# Patient Record
(demographics unavailable — no encounter records)

---

## 2025-02-02 NOTE — HISTORY OF PRESENT ILLNESS
[FreeTextEntry1] : Annual visit/hyperlipidemia [de-identified] : 59-year-old female past medical history of hyperlipidemia 20+ years smoking chronic EtOH use presents for annual visit.  Patient has not had an annual for 2 years.  Patient has had a rough past year she has been through divorce.  Patient currently working and trying to manage her life back together she is hopeful and optimistic, smiling during the interview.  Patient is a longtime smoker and continues to smoke currently about 5 cigarettes/day.  Patient does drink heavy amount of alcohol 3 to 4 glasses of wine every night with intermittent use of high moon.  Patient has no complaints to endorse today All other ROS is negative.

## 2025-02-02 NOTE — HEALTH RISK ASSESSMENT
[Fair] :  ~his/her~ mood as fair [4 or more  times a week (4 pts)] : 4 or more  times a week (4 points) [3 or 4 (1 pt)] : 3 or 4  (1 point) [Never (0 pts)] : Never (0 points) [No] : In the past 12 months have you used drugs other than those required for medical reasons? No [No falls in past year] : Patient reported no falls in the past year [1] : 2) Feeling down, depressed, or hopeless for several days (1) [PHQ-2 Negative - No further assessment needed] : PHQ-2 Negative - No further assessment needed [Current] : Current [20 or more] : 20 or more [HIV test declined] : HIV test declined [Hepatitis C test declined] : Hepatitis C test declined [Alone] : lives alone [Employed] : employed [College] : College [Single] : single [] :  [Sexually Active] : sexually active [Feels Safe at Home] : Feels safe at home [Fully functional (bathing, dressing, toileting, transferring, walking, feeding)] : Fully functional (bathing, dressing, toileting, transferring, walking, feeding) [Fully functional (using the telephone, shopping, preparing meals, housekeeping, doing laundry, using] : Fully functional and needs no help or supervision to perform IADLs (using the telephone, shopping, preparing meals, housekeeping, doing laundry, using transportation, managing medications and managing finances) [Reports normal functional visual acuity (ie: able to read med bottle)] : Reports normal functional visual acuity [Smoke Detector] : smoke detector [Carbon Monoxide Detector] : carbon monoxide detector [Safety elements used in home] : safety elements used in home [Yes] : Yes [de-identified] : none [Audit-CScore] : 5 [de-identified] : weight lifting [[id number]] : 2 [LowDoseCTScan] : never [Change in mental status noted] : No change in mental status noted [Language] : denies difficulty with language [Behavior] : denies difficulty with behavior [Learning/Retaining New Information] : denies difficulty learning/retaining new information [Handling Complex Tasks] : denies difficulty handling complex tasks [Spatial Ability and Orientation] : denies difficulty with spatial ability and orientation [Reasoning] : denies difficulty with reasoning [None] : None [High Risk Behavior] : no high risk behavior [Reports changes in hearing] : Reports no changes in hearing [Reports changes in vision] : Reports no changes in vision [MammogramDate] : 2023 [BoneDensityDate] : 2023 [ColonoscopyComments] :  03/23. Patient reported colonoscopy was normal. With Dr. Lee. [ColonoscopyDate] : 2023 [[facility]2] [facility]

## 2025-02-02 NOTE — PLAN
[FreeTextEntry1] :    59-year-old female past medical history of hyperlipidemia 20+ years smoking chronic EtOH use presents for annual visit.  Patient has not had an annual for 2 years.  Patient has had a rough past year she has been through divorce.  Patient currently working and trying to manage her life back together she is hopeful and optimistic, smiling during the interview.  Patient is a longtime smoker and continues to smoke currently about 5 cigarettes/day.  Patient does drink heavy amount of alcohol 3 to 4 glasses of wine every night with intermittent use of high moon.  Patient has no complaints to endorse today   Annual visit >> Blood work reviewed with patient at bedside >> EKG reviewed >> Mammo needed, DEXA scan, CT lung cancer screening  Hyperlipidemia >> LDL decreased from 2023, currently 128, previously 163 >> Patient advised to continue with diet  Smoking >> Educated on cessation of tobacco use; patient verbalized understanding but feels it has been a very rough time for her recently but she understands that she needs to quit >> Patient will go see cardiologist, referral placed for her cardiac risk assessment >> Patient has multiple risk factors chronic smoker , excessive EtOH use , and family history of coronary artery disease   excessiver ETOH intake >>drinks daily , several drinks >>educated on risks of etoh excessive use >>advised to cut down etoh - patient says she understands she needs to decrease amount, but no clear goal from patient.

## 2025-02-02 NOTE — HISTORY OF PRESENT ILLNESS
[FreeTextEntry1] : Annual visit/hyperlipidemia [de-identified] : 59-year-old female past medical history of hyperlipidemia 20+ years smoking chronic EtOH use presents for annual visit.  Patient has not had an annual for 2 years.  Patient has had a rough past year she has been through divorce.  Patient currently working and trying to manage her life back together she is hopeful and optimistic, smiling during the interview.  Patient is a longtime smoker and continues to smoke currently about 5 cigarettes/day.  Patient does drink heavy amount of alcohol 3 to 4 glasses of wine every night with intermittent use of high moon.  Patient has no complaints to endorse today All other ROS is negative.

## 2025-02-02 NOTE — HEALTH RISK ASSESSMENT
[Fair] :  ~his/her~ mood as fair [4 or more  times a week (4 pts)] : 4 or more  times a week (4 points) [3 or 4 (1 pt)] : 3 or 4  (1 point) [Never (0 pts)] : Never (0 points) [No] : In the past 12 months have you used drugs other than those required for medical reasons? No [No falls in past year] : Patient reported no falls in the past year [PHQ-2 Negative - No further assessment needed] : PHQ-2 Negative - No further assessment needed [1] : 2) Feeling down, depressed, or hopeless for several days (1) [Current] : Current [20 or more] : 20 or more [HIV test declined] : HIV test declined [Hepatitis C test declined] : Hepatitis C test declined [Alone] : lives alone [Employed] : employed [College] : College [Single] : single [] :  [Sexually Active] : sexually active [Feels Safe at Home] : Feels safe at home [Fully functional (bathing, dressing, toileting, transferring, walking, feeding)] : Fully functional (bathing, dressing, toileting, transferring, walking, feeding) [Fully functional (using the telephone, shopping, preparing meals, housekeeping, doing laundry, using] : Fully functional and needs no help or supervision to perform IADLs (using the telephone, shopping, preparing meals, housekeeping, doing laundry, using transportation, managing medications and managing finances) [Reports normal functional visual acuity (ie: able to read med bottle)] : Reports normal functional visual acuity [Smoke Detector] : smoke detector [Carbon Monoxide Detector] : carbon monoxide detector [Safety elements used in home] : safety elements used in home [Yes] : Yes [de-identified] : none [Audit-CScore] : 5 [de-identified] : weight lifting [[id number]] : 2 [LowDoseCTScan] : never [Change in mental status noted] : No change in mental status noted [Language] : denies difficulty with language [Behavior] : denies difficulty with behavior [Learning/Retaining New Information] : denies difficulty learning/retaining new information [Handling Complex Tasks] : denies difficulty handling complex tasks [Reasoning] : denies difficulty with reasoning [Spatial Ability and Orientation] : denies difficulty with spatial ability and orientation [None] : None [High Risk Behavior] : no high risk behavior [Reports changes in hearing] : Reports no changes in hearing [Reports changes in vision] : Reports no changes in vision [MammogramDate] : 2023 [BoneDensityDate] : 2023 [ColonoscopyDate] : 2023 [ColonoscopyComments] :  03/23. Patient reported colonoscopy was normal. With Dr. Lee. [[facility]

## 2025-02-02 NOTE — COUNSELING
[Yes] : Risk of tobacco use and health benefits of smoking cessation discussed: Yes [Cessation strategies including cessation program discussed] : Cessation strategies including cessation program discussed [Use of nicotine replacement therapies and other medications discussed] : Use of nicotine replacement therapies and other medications discussed [No] : Not willing to quit smoking [AUDIT-C Screening administered and reviewed] : AUDIT-C Screening administered and reviewed [Hazards of at-risk alcohol use discussed] : Hazards of at-risk alcohol use discussed [Strategies to reduce or eliminate alcohol use discussed] : Strategies to reduce or eliminate alcohol use discussed [Quit Drinking] : Quit Drinking [ - Annual Lung Cancer Screening/Share Decision Making Discussion] : Annual Lung Cancer Screening/Share Decision Making Discussion. (I have advised this patient to have a Low Dose CT (LDCT) scan of the lungs and have discussed the following with the patient in a shared decision making discussion:   Benefits of Detection and Early Treatment: There is adequate evidence that annual screening for lung cancer with LDCT in a population of high-risk persons can prevent a substantial number of lung cancer-related deaths. The magnitude of benefit depends on the individual patient's risk for lung cancer, as those who are at highest risk are most likely to benefit. Screening cannot prevent most lung cancer-related deaths, and does not replace smoking cessation. Harms of Detection and Early Intervention and Treatment: The harms associated with LDCT screening include false-negative and false-positive results, incidental findings, over diagnosis, and radiation exposure. False-positive LDCT results occur in a substantial proportion of screened persons; 95% of all positive results do not lead to a diagnosis of cancer. In a high-quality screening program, further imaging can resolve most false-positive results; however, some patients may require invasive procedures. Radiation harms, including cancer resulting from cumulative exposure to radiation, vary depending on the age at the start of screening; the number of scans received; and the person's exposure to other sources of radiation, particularly other medical imaging.) [None] : None [Good understanding] : Patient has a good understanding of lifestyle changes and steps needed to achieve self management goal [FreeTextEntry1] : 15

## 2025-02-12 NOTE — HISTORY OF PRESENT ILLNESS
[Current] : Current [TextBox_13] :   Ms. SHERINE RAMÍREZ is a 59 year old woman with a history of nicotine dependence   Over the telephone today we reviewed and confirmed that the patient meets screening eligibility criteria:  -Age: 59 years old  Smoking status:   -Current smoker, 20 pack years    Ms. RAMÍREZ denies any personal history of lung cancer. Denies any s/s of lung cancer. No lung cancer in a 1st degree relative. Denies any history of lung disease. Denies any history of occupational exposures [PacksperYear] : 20

## 2025-02-12 NOTE — REASON FOR VISIT
[Telephone (audio)] : This telephonic visit was provided via audio only technology. [Patient preference] : patient preference. [Verbal consent obtained from patient] : the patient, [unfilled] [Initial Evaluation] : an initial evaluation visit [Review of Eligibility] : review of eligibility [Low-Dose CT Screening Discussion] : low-dose CT lung cancer screening discussion [Virtual Visit] : virtual visit

## 2025-02-18 NOTE — HISTORY OF PRESENT ILLNESS
[TextBox_13] :   Ms. SHERINE RAMÍREZ is a 59 year old woman with a history of nicotine dependence   Over the telephone today we reviewed and confirmed that the patient meets screening eligibility criteria:  -Age: 59 years old  Smoking status:   -Current smoker, 20 pack years    Ms. RAMÍREZ denies any personal history of lung cancer. Denies any s/s of lung cancer. No lung cancer in a 1st degree relative. Denies any history of lung disease. Denies any history of occupational exposures [PacksperYear] : 20

## 2025-04-23 NOTE — HISTORY OF PRESENT ILLNESS
[FreeTextEntry1] : NICANOR P2 24 and 20 yo sons--older in [place] ,younger graduating [employer] Pt works for [employer] in [employer] [Mammogramdate] : emr [BoneDensityDate] : emr [ColonoscopyDate] : emr [Previously active] : previously active [Men] : men

## 2025-04-23 NOTE — PHYSICAL EXAM
[MA] : MA [FreeTextEntry2] : Rebeca Kumar [Appropriately responsive] : appropriately responsive [Alert] : alert [No Acute Distress] : no acute distress [No Lymphadenopathy] : no lymphadenopathy [Soft] : soft [Non-tender] : non-tender [Non-distended] : non-distended [No HSM] : No HSM [No Lesions] : no lesions [No Mass] : no mass [Oriented x3] : oriented x3 [Examination Of The Breasts] : a normal appearance [No Masses] : no breast masses were palpable [Labia Majora] : normal [Labia Minora] : normal [Normal] : normal [Uterine Adnexae] : normal

## 2025-04-23 NOTE — COUNSELING
[Nutrition/ Exercise/ Weight Management] : nutrition, exercise, weight management [Vitamins/Supplements] : vitamins/supplements [Breast Self Exam] : breast self exam [FreeTextEntry2] : smoking cessation

## 2025-04-23 NOTE — HISTORY OF PRESENT ILLNESS
[FreeTextEntry1] : NICANOR P2 24 and 22 yo sons--older in [place] ,younger graduating [employer] Pt works for [employer] in [employer] [Mammogramdate] : emr [BoneDensityDate] : emr [ColonoscopyDate] : emr [Previously active] : previously active [Men] : men